# Patient Record
Sex: MALE | Race: WHITE | Employment: PART TIME | URBAN - METROPOLITAN AREA
[De-identification: names, ages, dates, MRNs, and addresses within clinical notes are randomized per-mention and may not be internally consistent; named-entity substitution may affect disease eponyms.]

---

## 2019-12-05 ENCOUNTER — OFFICE VISIT (OUTPATIENT)
Dept: DERMATOLOGY | Age: 29
End: 2019-12-05
Payer: COMMERCIAL

## 2019-12-05 VITALS
HEART RATE: 82 BPM | WEIGHT: 269.4 LBS | SYSTOLIC BLOOD PRESSURE: 139 MMHG | BODY MASS INDEX: 39.9 KG/M2 | OXYGEN SATURATION: 98 % | DIASTOLIC BLOOD PRESSURE: 85 MMHG | HEIGHT: 69 IN

## 2019-12-05 DIAGNOSIS — L40.9 PSORIASIS: Primary | ICD-10-CM

## 2019-12-05 PROCEDURE — 99202 OFFICE O/P NEW SF 15 MIN: CPT | Performed by: DERMATOLOGY

## 2019-12-05 RX ORDER — TRIAMCINOLONE ACETONIDE 0.25 MG/G
CREAM TOPICAL
Qty: 30 G | Refills: 2 | Status: SHIPPED | OUTPATIENT
Start: 2019-12-05 | End: 2020-05-02 | Stop reason: SDUPTHER

## 2019-12-05 RX ORDER — KETOCONAZOLE 20 MG/ML
SHAMPOO TOPICAL
Qty: 120 ML | Refills: 2 | Status: SHIPPED | OUTPATIENT
Start: 2019-12-05 | End: 2020-05-02 | Stop reason: SDUPTHER

## 2019-12-05 RX ORDER — BETAMETHASONE DIPROPIONATE 0.05 %
OINTMENT (GRAM) TOPICAL
Qty: 50 G | Refills: 2 | Status: SHIPPED | OUTPATIENT
Start: 2019-12-05 | End: 2020-05-02 | Stop reason: SDUPTHER

## 2020-02-08 ENCOUNTER — HOSPITAL ENCOUNTER (EMERGENCY)
Age: 30
Discharge: HOME | End: 2020-02-08
Payer: COMMERCIAL

## 2020-02-08 VITALS
SYSTOLIC BLOOD PRESSURE: 142 MMHG | DIASTOLIC BLOOD PRESSURE: 92 MMHG | OXYGEN SATURATION: 97 % | HEART RATE: 88 BPM | TEMPERATURE: 98.2 F | BODY MASS INDEX: 39.25 KG/M2 | HEIGHT: 69 IN | WEIGHT: 265 LBS | RESPIRATION RATE: 16 BRPM

## 2020-02-08 PROCEDURE — 99213 OFFICE O/P EST LOW 20 MIN: CPT | Performed by: NURSE PRACTITIONER

## 2020-02-08 PROCEDURE — 99212 OFFICE O/P EST SF 10 MIN: CPT

## 2020-02-08 RX ORDER — PREDNISONE 20 MG/1
40 TABLET ORAL DAILY
Qty: 14 TABLET | Refills: 0 | Status: SHIPPED | OUTPATIENT
Start: 2020-02-08 | End: 2020-02-15

## 2020-02-08 RX ORDER — AMOXICILLIN AND CLAVULANATE POTASSIUM 875; 125 MG/1; MG/1
1 TABLET, FILM COATED ORAL 2 TIMES DAILY
Qty: 14 TABLET | Refills: 0 | Status: SHIPPED | OUTPATIENT
Start: 2020-02-08 | End: 2020-02-15

## 2020-02-08 ASSESSMENT — PAIN DESCRIPTION - FREQUENCY: FREQUENCY: CONTINUOUS

## 2020-02-08 ASSESSMENT — ENCOUNTER SYMPTOMS
SORE THROAT: 0
VOMITING: 0
COUGH: 1
SINUS PRESSURE: 1
SHORTNESS OF BREATH: 0
NAUSEA: 0

## 2020-02-08 ASSESSMENT — PAIN SCALES - GENERAL: PAINLEVEL_OUTOF10: 8

## 2020-02-08 ASSESSMENT — PAIN DESCRIPTION - PAIN TYPE: TYPE: ACUTE PAIN

## 2020-02-08 ASSESSMENT — PAIN DESCRIPTION - ONSET: ONSET: AWAKENED FROM SLEEP

## 2020-02-08 ASSESSMENT — PAIN DESCRIPTION - PROGRESSION: CLINICAL_PROGRESSION: GRADUALLY WORSENING

## 2020-02-08 ASSESSMENT — PAIN - FUNCTIONAL ASSESSMENT: PAIN_FUNCTIONAL_ASSESSMENT: ACTIVITIES ARE NOT PREVENTED

## 2020-02-08 ASSESSMENT — PAIN DESCRIPTION - LOCATION: LOCATION: FACE

## 2020-02-08 ASSESSMENT — PAIN DESCRIPTION - DESCRIPTORS: DESCRIPTORS: DISCOMFORT;PRESSURE;POUNDING

## 2020-03-06 ENCOUNTER — OFFICE VISIT (OUTPATIENT)
Dept: DERMATOLOGY | Age: 30
End: 2020-03-06
Payer: COMMERCIAL

## 2020-03-06 VITALS
HEART RATE: 81 BPM | WEIGHT: 271.6 LBS | SYSTOLIC BLOOD PRESSURE: 122 MMHG | DIASTOLIC BLOOD PRESSURE: 84 MMHG | OXYGEN SATURATION: 97 % | HEIGHT: 66 IN | BODY MASS INDEX: 43.65 KG/M2

## 2020-03-06 PROCEDURE — 99213 OFFICE O/P EST LOW 20 MIN: CPT | Performed by: DERMATOLOGY

## 2020-03-06 NOTE — PROGRESS NOTES
effective  at a safe dose. The dose will be adjusted according to your response to  treatment and any side effects you may experience. What are the possible side effects of methotrexate? Methotrexate can cause nausea, tiredness, diarrhoea or mouth ulcers. Rarely  hair loss and rashes may occur. Methotrexate can affect the white blood cell  count so that fewer white blood cells are produced (bone marrow suppression). You will be more prone to develop infections such as chest infections. You should see your doctor if:   You develop a sore throat, a fever or any other symptoms or signs of  infection.  You develop mouth ulcers.  You develop unexplained bruising or bleeding from the gums.  You develop nausea, vomiting, abdominal pain or dark urine.  You become breathless or develop a cough. Folic acid is frequently recommended as a vitamin supplement when taking  methotrexate because it may reduce the incidence of side effects such as  nausea. Liver and lung fibrosis are very rare complications that may occur when the  methotrexate has been taken for a number of years. If you have not had chicken-pox previously and come into contact with someone  who has chicken-pox or shingles infection, or if you develop chicken-pox or  shingles while you are taking methotrexate, you should see your doctor  immediately as you may need specialist treatment. How will I be monitored for the side effects of methotrexate treatment? Your doctor will arrange for you to have regular blood tests while you are taking  methotrexate. You must not take methotrexate unless you are having these  regular blood checks. Can I have vaccinations whilst on methotrexate? It is recommended that you should not be immunised using any of the 'live'  vaccines such as MMR (measles, mumps, rubella), polio and yellow fever.  An  'inactivated' polio vaccine can be given instead of the 'live' one and the  'inactivated' version should also be given to people you are in close contact  with, e.g. members of your household. If you are on methotrexate you should avoid contact with children who have  been given the 'live' polio vaccine, for 4-6 weeks after the vaccination. This  precaution is not necessary if inactivated polio vaccine is given. If you require  immunisation with a live vaccine, methotrexate should be stopped 6 months  before the vaccination and not prescribed until 2 weeks after the vaccination  has been given. Yearly flu and five yearly Pneumovax vaccinations are safe  and recommended. However, the new nasal flu vaccination is a live vaccine  and should not be given to patients on methotrexate (see Patient Information  Leaflet on Immunisations). Does methotrexate affect fertility, pregnancy and breast feeding? Methotrexate can reduce fertility in men, especially at higher doses, and is likely  to harm an unborn baby. Women must not take methotrexate during  pregnancy. Men and women should take effective contraceptive precautions  whilst taking methotrexate and for at least 3 months after stopping the  Methotrexate. If you are planning a family, or if you become pregnant while on methotrexate,  you must discuss this with your doctor as soon as possible. You must not breast feed if you are taking methotrexate. May I drink alcohol while I am taking methotrexate? Alcohol does interact with methotrexate. Both alcohol and methotrexate can  potentially damage the liver. It is advisable to keep alcohol consumption to a  minimum while taking methotrexate. Can I take other medicines at the same time as methotrexate? Some drugs interact with methotrexate and this can be dangerous. You should  always tell any doctor, nurse or pharmacist treating you that you are taking  Methotrexate. Special care is needed with non-steroidal anti-inflammatory drugs, such as  aspirin or ibuprofen.  You should only take anti-inflammatory drugs if your doctor  prescribes them for you. Paracetamol preparations are generally safer to take. Do not take 'over-the-counter' herbal or vitamin preparations without discussing  this first with your doctor, nurse or pharmacist. You must always avoid  antibiotics containing trimethoprim. Where can I find out more about methotrexate? If you want to know more about methotrexate, you should speak to your doctor  or pharmacist. Please note that this information leaflet does not list all of the  side effects of methotrexate. For further details, look at the drug information  sheet which comes as an insert with your prescription for methotrexate. Source: [facility]        Follow-up: No follow-ups on file. This note was created with the assistance of a speech-recognition program.  Although the intention is to generate a document that actually reflects the content of the visit, no guarantees can be provided that every mistake has been identified and corrected byediting.     Electronically signed by Evelyn Aparicio MD on 3/6/20 at 9:30 AM

## 2020-03-06 NOTE — PATIENT INSTRUCTIONS
recommended that you should not be immunised using any of the 'live'  vaccines such as MMR (measles, mumps, rubella), polio and yellow fever. An  'inactivated' polio vaccine can be given instead of the 'live' one and the  'inactivated' version should also be given to people you are in close contact  with, e.g. members of your household. If you are on methotrexate you should avoid contact with children who have  been given the 'live' polio vaccine, for 4-6 weeks after the vaccination. This  precaution is not necessary if inactivated polio vaccine is given. If you require  immunisation with a live vaccine, methotrexate should be stopped 6 months  before the vaccination and not prescribed until 2 weeks after the vaccination  has been given. Yearly flu and five yearly Pneumovax vaccinations are safe  and recommended. However, the new nasal flu vaccination is a live vaccine  and should not be given to patients on methotrexate (see Patient Information  Leaflet on Immunisations). Does methotrexate affect fertility, pregnancy and breast feeding? Methotrexate can reduce fertility in men, especially at higher doses, and is likely  to harm an unborn baby. Women must not take methotrexate during  pregnancy. Men and women should take effective contraceptive precautions  whilst taking methotrexate and for at least 3 months after stopping the  Methotrexate. If you are planning a family, or if you become pregnant while on methotrexate,  you must discuss this with your doctor as soon as possible. You must not breast feed if you are taking methotrexate. May I drink alcohol while I am taking methotrexate? Alcohol does interact with methotrexate. Both alcohol and methotrexate can  potentially damage the liver. It is advisable to keep alcohol consumption to a  minimum while taking methotrexate. Can I take other medicines at the same time as methotrexate?     Some drugs interact with methotrexate and this can be dangerous. You should  always tell any doctor, nurse or pharmacist treating you that you are taking  Methotrexate. Special care is needed with non-steroidal anti-inflammatory drugs, such as  aspirin or ibuprofen. You should only take anti-inflammatory drugs if your doctor  prescribes them for you. Paracetamol preparations are generally safer to take. Do not take 'over-the-counter' herbal or vitamin preparations without discussing  this first with your doctor, nurse or pharmacist. You must always avoid  antibiotics containing trimethoprim. Where can I find out more about methotrexate? If you want to know more about methotrexate, you should speak to your doctor  or pharmacist. Please note that this information leaflet does not list all of the  side effects of methotrexate. For further details, look at the drug information  sheet which comes as an insert with your prescription for methotrexate.     Source: [facility]

## 2020-03-10 ENCOUNTER — HOSPITAL ENCOUNTER (OUTPATIENT)
Age: 30
Discharge: HOME | End: 2020-03-10
Payer: COMMERCIAL

## 2020-03-10 LAB
ALBUMIN SERPL-MCNC: 4.7 G/DL (ref 3.5–5.1)
ALP BLD-CCNC: 83 U/L (ref 38–126)
ALT SERPL-CCNC: 53 U/L (ref 11–66)
ANION GAP SERPL CALCULATED.3IONS-SCNC: 15 MEQ/L (ref 8–16)
AST SERPL-CCNC: 29 U/L (ref 5–40)
BASOPHILS # BLD: 0.6 %
BASOPHILS ABSOLUTE: 0 THOU/MM3 (ref 0–0.1)
BILIRUB SERPL-MCNC: 0.6 MG/DL (ref 0.3–1.2)
BUN BLDV-MCNC: 13 MG/DL (ref 7–22)
CALCIUM SERPL-MCNC: 10.1 MG/DL (ref 8.5–10.5)
CHLORIDE BLD-SCNC: 102 MEQ/L (ref 98–111)
CO2: 26 MEQ/L (ref 23–33)
CREAT SERPL-MCNC: 0.9 MG/DL (ref 0.4–1.2)
EOSINOPHIL # BLD: 1.1 %
EOSINOPHILS ABSOLUTE: 0.1 THOU/MM3 (ref 0–0.4)
ERYTHROCYTE [DISTWIDTH] IN BLOOD BY AUTOMATED COUNT: 12.8 % (ref 11.5–14.5)
ERYTHROCYTE [DISTWIDTH] IN BLOOD BY AUTOMATED COUNT: 37.9 FL (ref 35–45)
GFR SERPL CREATININE-BSD FRML MDRD: > 90 ML/MIN/1.73M2
GLUCOSE BLD-MCNC: 89 MG/DL (ref 70–108)
HAV IGM SER IA-ACNC: NEGATIVE
HCT VFR BLD CALC: 51.1 % (ref 42–52)
HEMOGLOBIN: 16.8 GM/DL (ref 14–18)
HEPATITIS B CORE IGM ANTIBODY: NEGATIVE
HEPATITIS B SURFACE ANTIGEN: NEGATIVE
HEPATITIS C ANTIBODY: NEGATIVE
IMMATURE GRANS (ABS): 0.02 THOU/MM3 (ref 0–0.07)
IMMATURE GRANULOCYTES: 0.2 %
LYMPHOCYTES # BLD: 32.9 %
LYMPHOCYTES ABSOLUTE: 2.7 THOU/MM3 (ref 1–4.8)
MCH RBC QN AUTO: 27.5 PG (ref 26–33)
MCHC RBC AUTO-ENTMCNC: 32.9 GM/DL (ref 32.2–35.5)
MCV RBC AUTO: 83.6 FL (ref 80–94)
MONOCYTES # BLD: 13.2 %
MONOCYTES ABSOLUTE: 1.1 THOU/MM3 (ref 0.4–1.3)
NUCLEATED RED BLOOD CELLS: 0 /100 WBC
PLATELET # BLD: 187 THOU/MM3 (ref 130–400)
PMV BLD AUTO: 9.9 FL (ref 9.4–12.4)
POTASSIUM SERPL-SCNC: 3.7 MEQ/L (ref 3.5–5.2)
RBC # BLD: 6.11 MILL/MM3 (ref 4.7–6.1)
SEG NEUTROPHILS: 52 %
SEGMENTED NEUTROPHILS ABSOLUTE COUNT: 4.3 THOU/MM3 (ref 1.8–7.7)
SODIUM BLD-SCNC: 143 MEQ/L (ref 135–145)
TOTAL PROTEIN: 8 G/DL (ref 6.1–8)
WBC # BLD: 8.2 THOU/MM3 (ref 4.8–10.8)

## 2020-03-10 PROCEDURE — 36415 COLL VENOUS BLD VENIPUNCTURE: CPT

## 2020-03-10 PROCEDURE — 87389 HIV-1 AG W/HIV-1&-2 AB AG IA: CPT

## 2020-03-10 PROCEDURE — 80053 COMPREHEN METABOLIC PANEL: CPT

## 2020-03-10 PROCEDURE — 85025 COMPLETE CBC W/AUTO DIFF WBC: CPT

## 2020-03-10 PROCEDURE — 80074 ACUTE HEPATITIS PANEL: CPT

## 2020-03-10 PROCEDURE — 86480 TB TEST CELL IMMUN MEASURE: CPT

## 2020-03-11 LAB — HIV-2 AB: NEGATIVE

## 2020-03-12 LAB
QUANTI TB GOLD PLUS: NEGATIVE
QUANTI TB1 MINUS NIL: 0 IU/ML (ref 0–0.34)
QUANTI TB2 MINUS NIL: 0.01 IU/ML (ref 0–0.34)
QUANTIFERON MITOGEN MINUS NIL: 7.09 IU/ML
QUANTIFERON NIL: 0.03 IU/ML

## 2020-03-13 ENCOUNTER — TELEPHONE (OUTPATIENT)
Dept: DERMATOLOGY | Age: 30
End: 2020-03-13

## 2020-03-13 RX ORDER — FOLIC ACID 1 MG/1
1 TABLET ORAL DAILY
Qty: 30 TABLET | Refills: 5 | Status: SHIPPED | OUTPATIENT
Start: 2020-03-13

## 2020-03-13 NOTE — TELEPHONE ENCOUNTER
Please inform patient that labs are normal.    Start MTX 10 mg weekly x 2 weeks and folic acid 1 mg pill every day while on the methotrexate. Both are prescribed. He needs labs in 2 weeks prior to refilling and continuing the methotrexate. I recommend he go back to the lab about 3 days after his second dose. The orders for labs are entered.

## 2020-05-04 RX ORDER — BETAMETHASONE DIPROPIONATE 0.05 %
OINTMENT (GRAM) TOPICAL
Qty: 50 G | Refills: 0 | Status: SHIPPED | OUTPATIENT
Start: 2020-05-04 | End: 2020-09-01 | Stop reason: SDUPTHER

## 2020-05-04 RX ORDER — KETOCONAZOLE 20 MG/ML
SHAMPOO TOPICAL
Qty: 120 ML | Refills: 0 | Status: SHIPPED | OUTPATIENT
Start: 2020-05-04 | End: 2020-09-01 | Stop reason: SDUPTHER

## 2020-05-04 RX ORDER — TRIAMCINOLONE ACETONIDE 0.25 MG/G
CREAM TOPICAL
Qty: 30 G | Refills: 0 | Status: SHIPPED | OUTPATIENT
Start: 2020-05-04 | End: 2020-09-01 | Stop reason: SDUPTHER

## 2020-05-04 NOTE — TELEPHONE ENCOUNTER
Robertson Cuba is requesting a refill on the following medications:   Requested Prescriptions     Pending Prescriptions Disp Refills    betamethasone dipropionate (DIPROLENE) 0.05 % ointment 50 g 2     Sig: Apply to psoriasis on scalp and body twice daily x 2 weeks, daily x 2 weeks, then every other day until clear    triamcinolone (KENALOG) 0.025 % cream 30 g 2     Sig: Apply to psoriasis on face twice daily until clear    ketoconazole (NIZORAL) 2 % shampoo 120 mL 2     Sig: Apply 3-4 times weekly to scalp, leave on for five minutes prior to washing off       Last OV 3/6    Future Appointments   Date Time Provider Veronique Mallory   5/13/2020  9:30 AM MD gerard Mcmahon derm [facility]

## 2020-09-02 RX ORDER — BETAMETHASONE DIPROPIONATE 0.05 %
OINTMENT (GRAM) TOPICAL
Qty: 50 G | Refills: 2 | Status: SHIPPED | OUTPATIENT
Start: 2020-09-02

## 2020-09-02 RX ORDER — TRIAMCINOLONE ACETONIDE 0.25 MG/G
CREAM TOPICAL
Qty: 30 G | Refills: 2 | Status: SHIPPED | OUTPATIENT
Start: 2020-09-02

## 2020-09-02 RX ORDER — KETOCONAZOLE 20 MG/ML
SHAMPOO TOPICAL
Qty: 120 ML | Refills: 0 | Status: SHIPPED | OUTPATIENT
Start: 2020-09-02 | End: 2020-12-28

## 2020-12-28 RX ORDER — KETOCONAZOLE 20 MG/ML
SHAMPOO TOPICAL
Qty: 120 ML | Refills: 2 | Status: SHIPPED | OUTPATIENT
Start: 2020-12-28

## 2020-12-28 NOTE — TELEPHONE ENCOUNTER
Paul Clark is requesting a refill on the following medications:   Requested Prescriptions     Pending Prescriptions Disp Refills    ketoconazole (NIZORAL) 2 % shampoo [Pharmacy Med Name: KETOCONAZOLE 2% SHAMPOO] 120 mL 0     Sig: shampoo as directed 3 TO 4 TIMES PER WEEK--LEAVE ON FOR 5 MINUTES PRIOR TO WASHING OFF       Last OV 3/6    No future appointments. Vida Baker

## 2021-03-30 ENCOUNTER — HOSPITAL ENCOUNTER (OUTPATIENT)
Dept: ULTRASOUND IMAGING | Age: 31
Discharge: HOME | End: 2021-03-30
Payer: COMMERCIAL

## 2021-03-30 DIAGNOSIS — N50.89 TESTICULAR NODULE: ICD-10-CM

## 2021-03-30 DIAGNOSIS — N50.89: ICD-10-CM

## 2021-03-30 PROCEDURE — 76870 US EXAM SCROTUM: CPT

## 2022-12-15 ENCOUNTER — OFFICE VISIT (OUTPATIENT)
Dept: FAMILY MEDICINE CLINIC | Age: 32
End: 2022-12-15
Payer: COMMERCIAL

## 2022-12-15 VITALS
RESPIRATION RATE: 18 BRPM | OXYGEN SATURATION: 97 % | DIASTOLIC BLOOD PRESSURE: 88 MMHG | WEIGHT: 279 LBS | SYSTOLIC BLOOD PRESSURE: 142 MMHG | BODY MASS INDEX: 45.03 KG/M2 | HEART RATE: 88 BPM

## 2022-12-15 DIAGNOSIS — R03.0 SINGLE EPISODE OF ELEVATED BLOOD PRESSURE: ICD-10-CM

## 2022-12-15 DIAGNOSIS — L40.9 PSORIASIS: Primary | ICD-10-CM

## 2022-12-15 PROCEDURE — 99203 OFFICE O/P NEW LOW 30 MIN: CPT

## 2022-12-15 RX ORDER — TRIAMCINOLONE ACETONIDE 0.25 MG/G
CREAM TOPICAL
Qty: 30 G | Refills: 2 | Status: SHIPPED | OUTPATIENT
Start: 2022-12-15

## 2022-12-15 RX ORDER — BETAMETHASONE DIPROPIONATE 0.5 MG/G
CREAM TOPICAL
Qty: 15 G | Refills: 0 | Status: SHIPPED | OUTPATIENT
Start: 2022-12-15

## 2022-12-15 RX ORDER — KETOCONAZOLE 20 MG/ML
SHAMPOO TOPICAL
Qty: 120 ML | Refills: 2 | Status: SHIPPED | OUTPATIENT
Start: 2022-12-15

## 2022-12-15 SDOH — ECONOMIC STABILITY: FOOD INSECURITY: WITHIN THE PAST 12 MONTHS, YOU WORRIED THAT YOUR FOOD WOULD RUN OUT BEFORE YOU GOT MONEY TO BUY MORE.: NEVER TRUE

## 2022-12-15 SDOH — ECONOMIC STABILITY: FOOD INSECURITY: WITHIN THE PAST 12 MONTHS, THE FOOD YOU BOUGHT JUST DIDN'T LAST AND YOU DIDN'T HAVE MONEY TO GET MORE.: NEVER TRUE

## 2022-12-15 SDOH — ECONOMIC STABILITY: TRANSPORTATION INSECURITY
IN THE PAST 12 MONTHS, HAS THE LACK OF TRANSPORTATION KEPT YOU FROM MEDICAL APPOINTMENTS OR FROM GETTING MEDICATIONS?: NO

## 2022-12-15 SDOH — ECONOMIC STABILITY: TRANSPORTATION INSECURITY
IN THE PAST 12 MONTHS, HAS LACK OF TRANSPORTATION KEPT YOU FROM MEETINGS, WORK, OR FROM GETTING THINGS NEEDED FOR DAILY LIVING?: NO

## 2022-12-15 ASSESSMENT — PATIENT HEALTH QUESTIONNAIRE - PHQ9
SUM OF ALL RESPONSES TO PHQ QUESTIONS 1-9: 0
9. THOUGHTS THAT YOU WOULD BE BETTER OFF DEAD, OR OF HURTING YOURSELF: 0
3. TROUBLE FALLING OR STAYING ASLEEP: 0
5. POOR APPETITE OR OVEREATING: 0
1. LITTLE INTEREST OR PLEASURE IN DOING THINGS: 0
6. FEELING BAD ABOUT YOURSELF - OR THAT YOU ARE A FAILURE OR HAVE LET YOURSELF OR YOUR FAMILY DOWN: 0
7. TROUBLE CONCENTRATING ON THINGS, SUCH AS READING THE NEWSPAPER OR WATCHING TELEVISION: 0
10. IF YOU CHECKED OFF ANY PROBLEMS, HOW DIFFICULT HAVE THESE PROBLEMS MADE IT FOR YOU TO DO YOUR WORK, TAKE CARE OF THINGS AT HOME, OR GET ALONG WITH OTHER PEOPLE: 0
SUM OF ALL RESPONSES TO PHQ QUESTIONS 1-9: 0
4. FEELING TIRED OR HAVING LITTLE ENERGY: 0
SUM OF ALL RESPONSES TO PHQ QUESTIONS 1-9: 0
SUM OF ALL RESPONSES TO PHQ9 QUESTIONS 1 & 2: 0
8. MOVING OR SPEAKING SO SLOWLY THAT OTHER PEOPLE COULD HAVE NOTICED. OR THE OPPOSITE, BEING SO FIGETY OR RESTLESS THAT YOU HAVE BEEN MOVING AROUND A LOT MORE THAN USUAL: 0
2. FEELING DOWN, DEPRESSED OR HOPELESS: 0
SUM OF ALL RESPONSES TO PHQ QUESTIONS 1-9: 0

## 2022-12-15 ASSESSMENT — SOCIAL DETERMINANTS OF HEALTH (SDOH): HOW HARD IS IT FOR YOU TO PAY FOR THE VERY BASICS LIKE FOOD, HOUSING, MEDICAL CARE, AND HEATING?: NOT HARD AT ALL

## 2022-12-15 NOTE — PATIENT INSTRUCTIONS
Kenalog cream for face BID. Betamethasone dipropionate cream to rest of body BID  Ketoconazole shampoo.

## 2022-12-15 NOTE — PROGRESS NOTES
[address] [place] [address] DR. Cavazos [place] [address]  Dept: [address]:  [facility] [place] (:  1990) is a 29 y.o. male, here for evaluation of the following chief complaint(s):  New Patient ([facility])      ASSESSMENT/PLAN:  1. Psoriasis  -     ketoconazole (NIZORAL) 2 % shampoo; shampoo as directed 3 TO 4 TIMES PER WEEK--LEAVE ON FOR 5 MINUTES PRIOR TO WASHING OFF, Disp-120 mL, R-2, Normal  -     betamethasone dipropionate 0.05 % cream; Apply topically 2 times daily. , Disp-15 g, R-0, Normal  -     triamcinolone (KENALOG) 0.025 % cream; Apply to psoriasis on face twice daily until clear, Disp-30 g, R-2, Normal  2. Single episode of elevated blood pressure    Will restart past psoriasis topical treatments. Ketoconazole shampoo, kenalog cream for face, and diprolene cream for scattered body spots not on face, head, or neck. -If does not improve with topical may consider other therapies including biological injections like Humira. At that time would need additional lab work to evaluate if Pily Chang would be a good candidate for this. -BP elevated this visit. Discussed importance of life style modifications. Patient does not wish to start BP medications at this time and states he will work on improving diet and exercise. Instructed him to monitor BP at home and follow up if remains elevated. Return for As needed or if symptoms don't improve. Follow up in month if symptoms are not tolerable at that point. SUBJECTIVE/OBJECTIVE:  SIENA    Pily Chang is a new patient here to [facility]. He has history of moderate to severe psoriasis for which he previously followed dermatology for in the past. He was diagnosed with psoriasis around 2019. States his triggers are cold weather and stress. Sun light does greatly improve his psoriasis. He has not been back to dermatology since 2020.  He stopped taking his medications around December of 2020 and tried to manage with OTC lotions including Cereve. At the time of his last visit with dermatology he was be considered to start methotrexate but at that time opted out of that. Over the last few months his psoriasis has been worsening which coincides with his triggers of colder months and increased stressed. 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 He would like to be reevaluated for his psoriasis treatment but does not wish to go to dermatology. Psoriasis affects his face, mainly nasal folds, eyebrows, forehead, as well as top of his head, occipital region of his head, shoulders, bilateral knees, bilateral elbows, abdomin, buttocks, back, and lower legs. Spots are red, dry, flaky, scaly, and are extremely pruritic and painful to touch. Never has drainage or pus from skin lesions. No fevers. Other then psoriasis he does not report any past medical history. Family history of HTN and DM2. Does not currently watch his diet or exercise regularly. Past Medical History:   Diagnosis Date    H/O psoriasis         History reviewed. No pertinent surgical history.     Social History     Socioeconomic History    Marital status:      Spouse name: Not on file    Number of children: Not on file    Years of education: Not on file    Highest education level: Not on file   Occupational History    Not on file   Tobacco Use    Smoking status: Some Days     Types: Cigarettes    Smokeless tobacco: Never   Substance and Sexual Activity    Alcohol use: Yes    Drug use: No    Sexual activity: Not on file   Other Topics Concern    Not on file   Social History Narrative    Not on file     Social Determinants of Health     Financial Resource Strain: Low Risk     Difficulty of Paying Living Expenses: Not hard at all   Food Insecurity: No Food Insecurity    Worried About [address] in the Last Year: Never true    [address] in the Last Year: Never true   Transportation Needs: No Transportation Needs    Lack of Transportation (Medical): No    Lack of Transportation (Non-Medical): No   Physical Activity: Not on file   Stress: Not on file   Social Connections: Not on file   Intimate Partner Violence: Not on file   Housing Stability: Not on file        Family History   Problem Relation Age of Onset    Diabetes Father     High Blood Pressure Father         No Known Allergies     Review of Systems   Constitutional:  Negative for activity change, appetite change, chills, diaphoresis, fatigue, fever and unexpected weight change. HENT:  Negative for congestion, ear pain, postnasal drip, sinus pressure and sore throat. Eyes:  Negative for photophobia, pain, discharge, redness, itching and visual disturbance. Respiratory:  Negative for cough, chest tightness, shortness of breath, wheezing and stridor. Cardiovascular:  Negative for chest pain, palpitations and leg swelling. Gastrointestinal:  Negative for abdominal distention, abdominal pain, constipation, diarrhea, nausea and vomiting. Endocrine: Negative for polydipsia, polyphagia and polyuria. Genitourinary:  Negative for decreased urine volume, difficulty urinating, dysuria, flank pain, frequency, hematuria and urgency. Musculoskeletal:  Negative for arthralgias, back pain, gait problem, joint swelling, myalgias and neck pain. Skin:  Positive for rash (Thick, raised, red, scaled rash spread throughout body. Pruritc and painful). Negative for pallor. Allergic/Immunologic: Negative for environmental allergies and food allergies. Neurological:  Negative for dizziness, syncope, weakness, light-headedness, numbness and headaches. Hematological:  Negative for adenopathy. Psychiatric/Behavioral:  Negative for behavioral problems, self-injury, sleep disturbance and suicidal ideas. The patient is not nervous/anxious. Physical Exam  Vitals and nursing note reviewed.    Constitutional: General: He is not in acute distress. Appearance: Normal appearance. He is well-developed. He is obese. He is not diaphoretic. HENT:      Head: Normocephalic and atraumatic. Right Ear: External ear normal.      Left Ear: External ear normal.      Nose: Nose normal.   Eyes:      General: No scleral icterus. Right eye: No discharge. Left eye: No discharge. Conjunctiva/sclera: Conjunctivae normal.      Pupils: Pupils are equal, round, and reactive to light. Neck:      Thyroid: No thyromegaly. Cardiovascular:      Rate and Rhythm: Normal rate and regular rhythm. Pulses: Normal pulses. Heart sounds: Normal heart sounds. No murmur heard. Pulmonary:      Effort: Pulmonary effort is normal. No respiratory distress. Breath sounds: Normal breath sounds. No wheezing or rales. Abdominal:      Tenderness: There is no abdominal tenderness. Musculoskeletal:         General: Normal range of motion. Right shoulder: No swelling, tenderness or bony tenderness. Left shoulder: No swelling, tenderness or bony tenderness. Cervical back: Neck supple. No deformity, spasms, tenderness or bony tenderness. Thoracic back: No spasms, tenderness or bony tenderness. Lumbar back: No swelling, deformity, spasms, tenderness or bony tenderness. Right lower leg: No edema. Left lower leg: No edema. Skin:     General: Skin is warm and dry. Coloration: Skin is not pale. Findings: Rash present. Comments: Various patches of thick, erythremic, silvery-white scales spread throughout the body. Lesions noted to nasal folds, forehead, scalp, bilateral elbows, torso, back, bilateral knees, and bilateral lower legs. Neurological:      General: No focal deficit present. Mental Status: He is alert and oriented to person, place, and time. Motor: No abnormal muscle tone.       Coordination: Coordination normal.   Psychiatric:         Mood and Affect: Mood normal.         Speech: Speech normal.         Behavior: Behavior normal.         Thought Content: Thought content normal.         Judgment: Judgment normal.       Vitals:    12/15/22 1356   BP: (!) 142/88   Pulse: 88   Resp: 18   SpO2: 97%        On this date 12/15/2022 I have spent 34 minutes reviewing previous notes, test results and face to face with the patient discussing the diagnosis and importance of compliance with the treatment plan as well as documenting on the day of the visit. An electronic signature was used to authenticate this note.     LORAINE Hay - CNP

## 2022-12-16 ASSESSMENT — ENCOUNTER SYMPTOMS
SINUS PRESSURE: 0
STRIDOR: 0
WHEEZING: 0
PHOTOPHOBIA: 0
ABDOMINAL DISTENTION: 0
EYE DISCHARGE: 0
ABDOMINAL PAIN: 0
CONSTIPATION: 0
VOMITING: 0
DIARRHEA: 0
COUGH: 0
EYE REDNESS: 0
BACK PAIN: 0
NAUSEA: 0
SORE THROAT: 0
EYE ITCHING: 0
CHEST TIGHTNESS: 0
EYE PAIN: 0
SHORTNESS OF BREATH: 0

## 2022-12-31 DIAGNOSIS — L40.9 PSORIASIS: ICD-10-CM

## 2023-01-03 RX ORDER — TRIAMCINOLONE ACETONIDE 0.25 MG/G
CREAM TOPICAL
Qty: 30 G | Refills: 2 | Status: SHIPPED | OUTPATIENT
Start: 2023-01-03

## 2023-01-03 RX ORDER — BETAMETHASONE DIPROPIONATE 0.5 MG/G
CREAM TOPICAL
Qty: 15 G | Refills: 0 | Status: SHIPPED | OUTPATIENT
Start: 2023-01-03

## 2023-01-03 NOTE — TELEPHONE ENCOUNTER
Last appointment this department: 12/15/2022  Next appointment this department: Visit date not found

## 2023-01-11 DIAGNOSIS — L40.0 PLAQUE PSORIASIS: Primary | ICD-10-CM

## 2023-01-11 NOTE — PROGRESS NOTES
Called and discussed with patient regarding his plaque psoriasis. He currently uses betamethasone dipropionate 0.05% cream, kenalog 0.025% cream, and ketoconazole 2% shampoo. Rash has not improved. At this time I will place a referral for dermatology to further follow his plaque psoriasis with failed topical treatment. He is interested in systemic therapies.

## 2023-02-16 ENCOUNTER — HOSPITAL ENCOUNTER (OUTPATIENT)
Age: 33
Discharge: HOME | End: 2023-02-16
Payer: COMMERCIAL

## 2023-02-16 ENCOUNTER — HOSPITAL ENCOUNTER (OUTPATIENT)
Dept: GENERAL RADIOLOGY | Age: 33
Discharge: HOME | End: 2023-02-16
Payer: COMMERCIAL

## 2023-02-16 ENCOUNTER — OFFICE VISIT (OUTPATIENT)
Dept: FAMILY MEDICINE CLINIC | Age: 33
End: 2023-02-16
Payer: COMMERCIAL

## 2023-02-16 VITALS
HEART RATE: 82 BPM | BODY MASS INDEX: 44.84 KG/M2 | DIASTOLIC BLOOD PRESSURE: 84 MMHG | OXYGEN SATURATION: 99 % | SYSTOLIC BLOOD PRESSURE: 126 MMHG | WEIGHT: 279 LBS | HEIGHT: 66 IN | RESPIRATION RATE: 20 BRPM

## 2023-02-16 DIAGNOSIS — L40.9 PSORIASIS: ICD-10-CM

## 2023-02-16 DIAGNOSIS — R53.83 OTHER FATIGUE: ICD-10-CM

## 2023-02-16 DIAGNOSIS — R07.9 CHEST PAIN, UNSPECIFIED TYPE: Primary | ICD-10-CM

## 2023-02-16 DIAGNOSIS — N50.89 TESTICULAR NODULE: ICD-10-CM

## 2023-02-16 DIAGNOSIS — R06.02 SHORTNESS OF BREATH: ICD-10-CM

## 2023-02-16 DIAGNOSIS — R07.9 CHEST PAIN, UNSPECIFIED TYPE: ICD-10-CM

## 2023-02-16 DIAGNOSIS — Z13.220 SCREENING FOR LIPID DISORDERS: ICD-10-CM

## 2023-02-16 DIAGNOSIS — N50.3 EPIDIDYMAL CYST: ICD-10-CM

## 2023-02-16 LAB
ANION GAP SERPL CALC-SCNC: 10 MEQ/L (ref 8–16)
BASOPHILS ABSOLUTE: 0.1 THOU/MM3 (ref 0–0.1)
BASOPHILS NFR BLD AUTO: 0.6 %
BUN SERPL-MCNC: 11 MG/DL (ref 7–22)
CALCIUM SERPL-MCNC: 9.5 MG/DL (ref 8.5–10.5)
CHLORIDE SERPL-SCNC: 104 MEQ/L (ref 98–111)
CHOLEST SERPL-MCNC: 147 MG/DL (ref 100–199)
CO2 SERPL-SCNC: 28 MEQ/L (ref 23–33)
CREAT SERPL-MCNC: 0.8 MG/DL (ref 0.4–1.2)
DEPRECATED RDW RBC AUTO: 38.1 FL (ref 35–45)
EOSINOPHIL NFR BLD AUTO: 1.1 %
EOSINOPHILS ABSOLUTE: 0.1 THOU/MM3 (ref 0–0.4)
ERYTHROCYTE [DISTWIDTH] IN BLOOD BY AUTOMATED COUNT: 12.9 % (ref 11.5–14.5)
GFR SERPL CREATININE-BSD FRML MDRD: > 60 ML/MIN/1.73M2
GLUCOSE SERPL-MCNC: 78 MG/DL (ref 70–108)
HCT VFR BLD AUTO: 49 % (ref 42–52)
HDLC SERPL-MCNC: 34 MG/DL
HGB BLD-MCNC: 16.3 GM/DL (ref 14–18)
IMM GRANULOCYTES # BLD AUTO: 0.03 THOU/MM3 (ref 0–0.07)
IMM GRANULOCYTES NFR BLD AUTO: 0.3 %
LDLC SERPL CALC-MCNC: 92 MG/DL
LYMPHOCYTES ABSOLUTE: 3.2 THOU/MM3 (ref 1–4.8)
LYMPHOCYTES NFR BLD AUTO: 27.9 %
MCH RBC QN AUTO: 27.4 PG (ref 26–33)
MCHC RBC AUTO-ENTMCNC: 33.3 GM/DL (ref 32.2–35.5)
MCV RBC AUTO: 82.4 FL (ref 80–94)
MONOCYTES ABSOLUTE: 1.2 THOU/MM3 (ref 0.4–1.3)
MONOCYTES NFR BLD AUTO: 10.8 %
NEUTROPHILS NFR BLD AUTO: 59.3 %
NRBC BLD AUTO-RTO: 0 /100 WBC
PLATELET # BLD AUTO: 199 THOU/MM3 (ref 130–400)
PMV BLD AUTO: 9.7 FL (ref 9.4–12.4)
POTASSIUM SERPL-SCNC: 3.9 MEQ/L (ref 3.5–5.2)
RBC # BLD AUTO: 5.95 MILL/MM3 (ref 4.7–6.1)
SEGMENTED NEUTROPHILS ABSOLUTE COUNT: 6.8 THOU/MM3 (ref 1.8–7.7)
SODIUM SERPL-SCNC: 142 MEQ/L (ref 135–145)
TRIGL SERPL-MCNC: 106 MG/DL (ref 0–199)
TSH SERPL DL<=0.005 MIU/L-ACNC: 3.86 UIU/ML (ref 0.4–4.2)
WBC # BLD AUTO: 11.4 THOU/MM3 (ref 4.8–10.8)

## 2023-02-16 PROCEDURE — 93000 ELECTROCARDIOGRAM COMPLETE: CPT

## 2023-02-16 PROCEDURE — 36415 COLL VENOUS BLD VENIPUNCTURE: CPT

## 2023-02-16 PROCEDURE — 84443 ASSAY THYROID STIM HORMONE: CPT

## 2023-02-16 PROCEDURE — 99213 OFFICE O/P EST LOW 20 MIN: CPT

## 2023-02-16 PROCEDURE — 80061 LIPID PANEL: CPT

## 2023-02-16 PROCEDURE — 80048 BASIC METABOLIC PNL TOTAL CA: CPT

## 2023-02-16 PROCEDURE — 71046 X-RAY EXAM CHEST 2 VIEWS: CPT

## 2023-02-16 PROCEDURE — 85025 COMPLETE CBC W/AUTO DIFF WBC: CPT

## 2023-02-16 SDOH — ECONOMIC STABILITY: HOUSING INSECURITY
IN THE LAST 12 MONTHS, WAS THERE A TIME WHEN YOU DID NOT HAVE A STEADY PLACE TO SLEEP OR SLEPT IN A SHELTER (INCLUDING NOW)?: NO

## 2023-02-16 SDOH — ECONOMIC STABILITY: FOOD INSECURITY: WITHIN THE PAST 12 MONTHS, THE FOOD YOU BOUGHT JUST DIDN'T LAST AND YOU DIDN'T HAVE MONEY TO GET MORE.: NEVER TRUE

## 2023-02-16 SDOH — ECONOMIC STABILITY: INCOME INSECURITY: HOW HARD IS IT FOR YOU TO PAY FOR THE VERY BASICS LIKE FOOD, HOUSING, MEDICAL CARE, AND HEATING?: NOT HARD AT ALL

## 2023-02-16 SDOH — ECONOMIC STABILITY: FOOD INSECURITY: WITHIN THE PAST 12 MONTHS, YOU WORRIED THAT YOUR FOOD WOULD RUN OUT BEFORE YOU GOT MONEY TO BUY MORE.: NEVER TRUE

## 2023-02-16 ASSESSMENT — ENCOUNTER SYMPTOMS
WHEEZING: 0
SINUS PRESSURE: 0
ABDOMINAL PAIN: 0
CHEST TIGHTNESS: 1
SORE THROAT: 0
EYE ITCHING: 0
BACK PAIN: 0
RHINORRHEA: 0
CHOKING: 0
EYE PAIN: 0
STRIDOR: 0
APNEA: 0
ABDOMINAL DISTENTION: 0
PHOTOPHOBIA: 0
CONSTIPATION: 0
SHORTNESS OF BREATH: 1
NAUSEA: 0
RECTAL PAIN: 1
VOMITING: 0
COUGH: 0
EYE REDNESS: 0
DIARRHEA: 0

## 2023-02-16 ASSESSMENT — PATIENT HEALTH QUESTIONNAIRE - PHQ9
SUM OF ALL RESPONSES TO PHQ QUESTIONS 1-9: 0
SUM OF ALL RESPONSES TO PHQ9 QUESTIONS 1 & 2: 0
1. LITTLE INTEREST OR PLEASURE IN DOING THINGS: 0
SUM OF ALL RESPONSES TO PHQ QUESTIONS 1-9: 0
SUM OF ALL RESPONSES TO PHQ QUESTIONS 1-9: 0
2. FEELING DOWN, DEPRESSED OR HOPELESS: 0
SUM OF ALL RESPONSES TO PHQ QUESTIONS 1-9: 0

## 2023-02-16 NOTE — PROGRESS NOTES
[address] [place] [address] DR. Cavazos [place] [address]  Dept: [phone]:  [facility] Randallia Salinas (:  1990) is a 29 y.o. male, here for evaluation of the following chief complaint(s):  Follow-up      ASSESSMENT/PLAN:  1. Chest pain, unspecified type  -     EKG 12 Lead - Clinic Performed; Future  -     XR CHEST STANDARD (2 VW); Future  2. Shortness of breath  -     CBC with Auto Differential; Future  -     Basic Metabolic Panel; Future  -     XR CHEST STANDARD (2 VW); Future  3. Other fatigue  -     CBC with Auto Differential; Future  -     Basic Metabolic Panel; Future  -     TSH with Reflex; Future  4. Epididymal cyst  -     US SCROTUM AND TESTICLES; Future  5. Testicular nodule  -     US SCROTUM AND TESTICLES; Future  6. Screening for lipid disorders  -     Lipid Panel; Future  7. Psoriasis    In office EKG interpretation- NSR within normal limits. Rate 88, , . No t-wave abnormality. No ST segment elevation or depression. Normal Chest XR. Will obtain labs to further evaluate symptoms. He was tasked with focusing on improving his diet and exercise to help promote weight loss. Keep an at home BP journal. Follow up if BP remain elevated above 140/80 at home. Smoking cessations discussed. Trial Prilosec OTC once a day as his symptoms may be related to GERD. Fasting lipid to screen for lipid disorders. US of scrotum/testicles to reevaluate bilateral epididymal head cysts seen on previous US. Plaque psoriasis stable with current regimen. Continue follow up with dermatology. Educated on signs of worsening symptoms and when prompt medical attention is needed and when he should seek further care. Return in 6 months (on 2023) for Follow up, As needed or if symptoms don't improve.     SUBJECTIVE/OBJECTIVE:  SIENA    Barrera Mcclure presents today to discuss a a few concerns that he and his wife are having regarding his overall health. First, he states that he has been having some chest pain, pressure, and tightness in the mid sternal region and in his back between his shoulder blades. This has been ongoing since Friday. The pain does occasionally radiate down his left arm. Around the same time he has noticed some SOB both with exertion and at rest. He is currently having some minor SOB sitting here now. His BP a few months ago was elevated. Today his BP on arrival was 156/88 and the recheck after allowing him to sit down for awhile was 126/84. He does not check his BP at home. He does not monitor his diet. He does not exercise. 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 He is able to control his anxiety for the most part on his own or by talking with friends/family. He does drink coffee fairly regularly but does not drink additionally caffeine. He does socially drink alcohol and when he drinks he does occasionally smoke cigarettes. He has not recently been sick. He denies fever, congestion, cough, wheezing, body aches, chills, headaches, vision changes, hearing loss, balance concerns, or weakness. He does not report any heartburn but he does occasionally take TUMS and pepto when he has in onset of chest pain and this tends to improve pain. His chest does not currently hurt. He does overall feel fatigued most days. This could be due to lack of consistent sleep schedule due to job. He has not had any recent lab work. Secondly, he does report history of bilateral epididymal head cysts noted on 3/25/2021 US of scrotum and testicles. This was obtained due to noticed testicle lump on right side. Since then Ernesto Escobar has noticed the right nodule seems to have increased. He denies any pain, discomfort, sexual dysfunction, burning with urination or penile discharge. Additionally, Ernesto Escobar was referred to dermatology for severe plaque psoriasis. He was started on a topical PDE-4 inhibitor - Zoryve (roflumilast) once daily cream. His psoriasis has much improved since starting. He does have some dry areas to his flexor elbows as well as a red/dry plaque area to his upper hand but for the most part his rash has improved. It does still itch but nearly as bad as prior. Past Medical History:   Diagnosis Date    H/O psoriasis         History reviewed. No pertinent surgical history. Social History     Socioeconomic History    Marital status:      Spouse name: Not on file    Number of children: Not on file    Years of education: Not on file    Highest education level: Not on file   Occupational History    Not on file   Tobacco Use    Smoking status: Some Days     Types: Cigarettes    Smokeless tobacco: Never   Substance and Sexual Activity    Alcohol use: Yes    Drug use: No    Sexual activity: Not on file   Other Topics Concern    Not on file   Social History Narrative    Not on file     Social Determinants of Health     Financial Resource Strain: Low Risk     Difficulty of Paying Living Expenses: Not hard at all   Food Insecurity: No Food Insecurity    Worried About [address] in the Last Year: Never true    [address] [employer] in the Last Year: Never true   Transportation Needs: No Transportation Needs    Lack of Transportation (Medical): No    Lack of Transportation (Non-Medical): No   Physical Activity: Not on file   Stress: Not on file   Social Connections: Not on file   Intimate Partner Violence: Not on file   Housing Stability: Unknown    Unable to Pay for Housing in the Last Year: Not on file    Number of Places Lived in the Last Year: Not on file    Unstable Housing in the Last Year: No        Family History   Problem Relation Age of Onset    Diabetes Father     High Blood Pressure Father         No Known Allergies     Review of Systems   Constitutional:  Positive for fatigue.  Negative for activity change, appetite change, chills, fever and unexpected weight change. HENT:  Negative for congestion, postnasal drip, rhinorrhea, sinus pressure and sore throat. Eyes:  Negative for photophobia, pain, redness, itching and visual disturbance. Respiratory:  Positive for chest tightness and shortness of breath. Negative for apnea, cough, choking, wheezing and stridor. Cardiovascular:  Positive for chest pain. Negative for palpitations and leg swelling. Gastrointestinal:  Positive for rectal pain. Negative for abdominal distention, abdominal pain, constipation, diarrhea, nausea and vomiting. Endocrine: Negative for cold intolerance, heat intolerance, polydipsia, polyphagia and polyuria. Genitourinary:  Negative for difficulty urinating, dysuria, frequency and urgency. Musculoskeletal:  Negative for back pain and myalgias. Skin:  Positive for rash. Neurological:  Negative for dizziness, weakness, light-headedness and headaches. Hematological:  Negative for adenopathy. Psychiatric/Behavioral:  Negative for behavioral problems, decreased concentration, self-injury, sleep disturbance and suicidal ideas. The patient is not nervous/anxious. Physical Exam  Vitals and nursing note reviewed. Constitutional:       General: He is not in acute distress. Appearance: Normal appearance. He is well-developed. He is obese. He is not diaphoretic. HENT:      Head: Normocephalic and atraumatic. Right Ear: Tympanic membrane, ear canal and external ear normal.      Left Ear: Tympanic membrane, ear canal and external ear normal.      Nose: Nose normal.   Eyes:      General: No scleral icterus. Right eye: No discharge. Left eye: No discharge. Conjunctiva/sclera: Conjunctivae normal.      Pupils: Pupils are equal, round, and reactive to light. Neck:      Thyroid: No thyromegaly. Cardiovascular:      Rate and Rhythm: Normal rate and regular rhythm. Pulses: Normal pulses. Heart sounds: Normal heart sounds.  No murmur heard. No friction rub. No gallop. Pulmonary:      Effort: Pulmonary effort is normal. No respiratory distress. Breath sounds: Normal breath sounds. No wheezing or rales. Chest:      Chest wall: No tenderness. Abdominal:      Palpations: Abdomen is soft. Tenderness: There is no abdominal tenderness. There is no right CVA tenderness or left CVA tenderness. Hernia: No hernia is present. Musculoskeletal:         General: Normal range of motion. Right shoulder: No swelling, tenderness or bony tenderness. Left shoulder: No swelling, tenderness or bony tenderness. Cervical back: Normal range of motion and neck supple. No deformity, spasms, tenderness or bony tenderness. Thoracic back: No spasms, tenderness or bony tenderness. Lumbar back: No swelling, deformity, spasms, tenderness or bony tenderness. Right lower leg: No edema. Left lower leg: No edema. Lymphadenopathy:      Cervical: No cervical adenopathy. Skin:     General: Skin is warm and dry. Coloration: Skin is not pale. Findings: Rash present. Comments: Various patches of thick, erythremic, silvery-white scales spread throughout the body. Lesions noted to bilateral elbows, torso, back, bilateral knees, right dorsal hand, and bilateral lower legs. Rash improved since last visit. Neurological:      General: No focal deficit present. Mental Status: He is alert and oriented to person, place, and time. Motor: No weakness or abnormal muscle tone. Coordination: Coordination normal.   Psychiatric:         Mood and Affect: Mood normal.         Speech: Speech normal.         Behavior: Behavior normal.         Thought Content:  Thought content normal.         Judgment: Judgment normal.       Vitals:    02/16/23 1000 02/16/23 1050   BP: (!) 156/88 126/84   Site: Right Upper Arm    Position: Sitting    Cuff Size: Large Adult    Pulse: 74 82   Resp: 20    SpO2: 99%    Weight: 279 lb (126.6 kg)    Height: 5' 6\" (1.676 m)       BP Readings from Last 3 Encounters:   02/16/23 126/84   12/15/22 (!) 142/88   03/06/20 122/84       On this date 2/16/2023 I have spent 29 minutes reviewing previous notes, test results and face to face with the patient discussing the diagnosis and importance of compliance with the treatment plan as well as documenting on the day of the visit.      An electronic signature was used to authenticate this note.    Ritesh Leal, LORAINE - CNP

## 2023-02-17 ENCOUNTER — HOSPITAL ENCOUNTER (OUTPATIENT)
Dept: ULTRASOUND IMAGING | Age: 33
Discharge: HOME | End: 2023-02-17
Payer: COMMERCIAL

## 2023-02-17 DIAGNOSIS — N50.3 EPIDIDYMAL CYST: ICD-10-CM

## 2023-02-17 DIAGNOSIS — N50.89 TESTICULAR NODULE: ICD-10-CM

## 2023-02-17 PROCEDURE — 76870 US EXAM SCROTUM: CPT

## 2023-03-22 ENCOUNTER — TELEPHONE (OUTPATIENT)
Dept: FAMILY MEDICINE CLINIC | Age: 33
End: 2023-03-22

## 2023-03-22 DIAGNOSIS — E66.01 CLASS 3 SEVERE OBESITY WITHOUT SERIOUS COMORBIDITY WITH BODY MASS INDEX (BMI) OF 45.0 TO 49.9 IN ADULT, UNSPECIFIED OBESITY TYPE (HCC): Primary | ICD-10-CM

## 2023-03-22 NOTE — TELEPHONE ENCOUNTER
Pt called stating he would like to start taking Semaglutide. His wife takes it and he wants to try it. He is requesting a phone call from Victor [facility].

## 2023-03-22 NOTE — TELEPHONE ENCOUNTER
Called and discussed weight management with patient. Patient BMI form last visit on 2/16 was 45.03  Since then he has tried to improve diet by monitoring carbs and eating less fats.     -Discussed semaglutide prescription. Patient would like to try medication to assist with weight loss. He states he can get this prescription through outside provider. Did have recent lab work which was normal. I told him normal dosages starting out, how medication works, how to administer and possible side effects. Follow up in 3 months if he does start medication. He is to continue to focus on improving diet but also increase aerobic exercise to 30 minutes each day for at least 5 days a weeks.

## 2023-07-13 ENCOUNTER — HOSPITAL ENCOUNTER (OUTPATIENT)
Age: 33
Discharge: HOME | End: 2023-07-13

## 2023-07-13 PROCEDURE — 86480 TB TEST CELL IMMUN MEASURE: CPT

## 2023-07-16 LAB
GAMMA INTERFERON BACKGROUND BLD IA-ACNC: 0.02 IU/ML
M TB IFN-G BLD-IMP: NEGATIVE
M TB IFN-G CD4+ BCKGRND COR BLD-ACNC: 0 IU/ML (ref 0–0.34)
M TB IFN-G CD4+CD8+ BCKGRND COR BLD-ACNC: 0 IU/ML (ref 0–0.34)
MITOGEN IGNF BCKGRD COR BLD-ACNC: > 10 IU/ML

## 2024-05-21 ENCOUNTER — OFFICE VISIT (OUTPATIENT)
Dept: FAMILY MEDICINE CLINIC | Age: 34
End: 2024-05-21
Payer: COMMERCIAL

## 2024-05-21 VITALS
WEIGHT: 274.5 LBS | BODY MASS INDEX: 44.11 KG/M2 | DIASTOLIC BLOOD PRESSURE: 88 MMHG | HEART RATE: 74 BPM | TEMPERATURE: 97.5 F | OXYGEN SATURATION: 97 % | HEIGHT: 66 IN | SYSTOLIC BLOOD PRESSURE: 138 MMHG | RESPIRATION RATE: 18 BRPM

## 2024-05-21 DIAGNOSIS — R53.83 OTHER FATIGUE: Primary | ICD-10-CM

## 2024-05-21 DIAGNOSIS — Z13.29 SCREENING FOR THYROID DISORDER: ICD-10-CM

## 2024-05-21 DIAGNOSIS — E66.01 CLASS 3 SEVERE OBESITY WITHOUT SERIOUS COMORBIDITY WITH BODY MASS INDEX (BMI) OF 45.0 TO 49.9 IN ADULT, UNSPECIFIED OBESITY TYPE (HCC): ICD-10-CM

## 2024-05-21 DIAGNOSIS — Z13.220 ENCOUNTER FOR SCREENING FOR LIPID DISORDER: ICD-10-CM

## 2024-05-21 DIAGNOSIS — I10 PRIMARY HYPERTENSION: ICD-10-CM

## 2024-05-21 LAB
ANION GAP SERPL CALC-SCNC: 10 MEQ/L (ref 8–16)
BASOPHILS ABSOLUTE: 0 THOU/MM3 (ref 0–0.1)
BASOPHILS NFR BLD AUTO: 0.4 %
BUN SERPL-MCNC: 12 MG/DL (ref 7–22)
CALCIUM SERPL-MCNC: 10.1 MG/DL (ref 8.5–10.5)
CHLORIDE SERPL-SCNC: 103 MEQ/L (ref 98–111)
CHOLEST SERPL-MCNC: 147 MG/DL (ref 100–199)
CO2 SERPL-SCNC: 27 MEQ/L (ref 23–33)
CREAT SERPL-MCNC: 0.8 MG/DL (ref 0.4–1.2)
DEPRECATED RDW RBC AUTO: 38.7 FL (ref 35–45)
EOSINOPHIL NFR BLD AUTO: 1.3 %
EOSINOPHILS ABSOLUTE: 0.1 THOU/MM3 (ref 0–0.4)
ERYTHROCYTE [DISTWIDTH] IN BLOOD BY AUTOMATED COUNT: 13.2 % (ref 11.5–14.5)
GFR SERPL CREATININE-BSD FRML MDRD: > 90 ML/MIN/1.73M2
GLUCOSE SERPL-MCNC: 79 MG/DL (ref 70–108)
HCT VFR BLD AUTO: 54.5 % (ref 42–52)
HDLC SERPL-MCNC: 34 MG/DL
HGB BLD-MCNC: 18 GM/DL (ref 14–18)
IMM GRANULOCYTES # BLD AUTO: 0.02 THOU/MM3 (ref 0–0.07)
IMM GRANULOCYTES NFR BLD AUTO: 0.2 %
LDLC SERPL CALC-MCNC: 91 MG/DL
LYMPHOCYTES ABSOLUTE: 3.6 THOU/MM3 (ref 1–4.8)
LYMPHOCYTES NFR BLD AUTO: 32.2 %
MCH RBC QN AUTO: 27.9 PG (ref 26–33)
MCHC RBC AUTO-ENTMCNC: 33 GM/DL (ref 32.2–35.5)
MCV RBC AUTO: 84.4 FL (ref 80–94)
MONOCYTES ABSOLUTE: 1 THOU/MM3 (ref 0.4–1.3)
MONOCYTES NFR BLD AUTO: 9.3 %
NEUTROPHILS ABSOLUTE: 6.3 THOU/MM3 (ref 1.8–7.7)
NEUTROPHILS NFR BLD AUTO: 56.6 %
NRBC BLD AUTO-RTO: 0 /100 WBC
PLATELET # BLD AUTO: 209 THOU/MM3 (ref 130–400)
PMV BLD AUTO: 10.8 FL (ref 9.4–12.4)
POTASSIUM SERPL-SCNC: 4 MEQ/L (ref 3.5–5.2)
RBC # BLD AUTO: 6.46 MILL/MM3 (ref 4.7–6.1)
SODIUM SERPL-SCNC: 140 MEQ/L (ref 135–145)
T4 FREE SERPL-MCNC: 1.26 NG/DL (ref 0.93–1.68)
TRIGL SERPL-MCNC: 110 MG/DL (ref 0–199)
TSH SERPL DL<=0.005 MIU/L-ACNC: 4.37 UIU/ML (ref 0.4–4.2)
WBC # BLD AUTO: 11.1 THOU/MM3 (ref 4.8–10.8)

## 2024-05-21 PROCEDURE — 99213 OFFICE O/P EST LOW 20 MIN: CPT

## 2024-05-21 PROCEDURE — 3075F SYST BP GE 130 - 139MM HG: CPT

## 2024-05-21 PROCEDURE — 3079F DIAST BP 80-89 MM HG: CPT

## 2024-05-21 SDOH — ECONOMIC STABILITY: INCOME INSECURITY: HOW HARD IS IT FOR YOU TO PAY FOR THE VERY BASICS LIKE FOOD, HOUSING, MEDICAL CARE, AND HEATING?: NOT HARD AT ALL

## 2024-05-21 SDOH — ECONOMIC STABILITY: FOOD INSECURITY: WITHIN THE PAST 12 MONTHS, YOU WORRIED THAT YOUR FOOD WOULD RUN OUT BEFORE YOU GOT MONEY TO BUY MORE.: NEVER TRUE

## 2024-05-21 SDOH — ECONOMIC STABILITY: FOOD INSECURITY: WITHIN THE PAST 12 MONTHS, THE FOOD YOU BOUGHT JUST DIDN'T LAST AND YOU DIDN'T HAVE MONEY TO GET MORE.: NEVER TRUE

## 2024-05-21 ASSESSMENT — PATIENT HEALTH QUESTIONNAIRE - PHQ9
SUM OF ALL RESPONSES TO PHQ9 QUESTIONS 1 & 2: 0
SUM OF ALL RESPONSES TO PHQ QUESTIONS 1-9: 0
1. LITTLE INTEREST OR PLEASURE IN DOING THINGS: NOT AT ALL
2. FEELING DOWN, DEPRESSED OR HOPELESS: NOT AT ALL

## 2024-05-21 ASSESSMENT — ENCOUNTER SYMPTOMS
COUGH: 0
ABDOMINAL DISTENTION: 0
SORE THROAT: 0
NAUSEA: 0
SHORTNESS OF BREATH: 0
VOMITING: 0
PHOTOPHOBIA: 0
RHINORRHEA: 0
CHEST TIGHTNESS: 0
WHEEZING: 0
ABDOMINAL PAIN: 0
CONSTIPATION: 0
DIARRHEA: 0
EYE ITCHING: 0
RECTAL PAIN: 0
CHOKING: 0
EYE PAIN: 0
SINUS PRESSURE: 0
STRIDOR: 0
APNEA: 0
BACK PAIN: 0
EYE REDNESS: 0

## 2024-05-21 NOTE — PROGRESS NOTES
[facility]  [address]   [facility] [address]  Dept: [phone]  Loc: [phone]     Pete Jean (:  1990) is a 33 y.o. male, here for evaluation of the following chief complaint(s):  Wants to get blood work ordered and Hypertension      ASSESSMENT/PLAN:  1. Other fatigue  -     Basic Metabolic Panel; Future  -     CBC with Auto Differential; Future  -     TSH with Reflex; Future  2. Encounter for screening for lipid disorder  -     Lipid Panel; Future  3. Screening for thyroid disorder  -     TSH with Reflex; Future  4. Class 3 severe obesity without serious comorbidity with body mass index (BMI) of 45.0 to 49.9 in adult, unspecified obesity type (HCC)  -     Semaglutide-Weight Management (WEGOVY) 1.7 MG/0.75ML SOAJ SC injection; Inject 1.7 mg into the skin every 7 days, Disp-3 mL, R-0Normal  5. Primary hypertension  -     Basic Metabolic Panel; Future  -     CBC with Auto Differential; Future  Recheck fasting labs.    Blood pressure stable today.    Weight management discussed.  Recommend improving diet and limiting carb intake.  May try starting carb counting.  He should start to increase his exercise.  I did recommend 150 minutes/week of moderate activity.  Will send in Wegovy to help further assist his weight management.  He is currently on the dose of 1.7 mg.      Return in about 1 year (around 2025) for Follow up.    SUBJECTIVE/OBJECTIVE:  SIENA Freeman is here today for follow-up.  He states that overall he has been doing well.  He is following with dermatology for his history of psoriasis.  He is on Skyrizi which has greatly helped.  He has no current rash.    He has history of intermittent high blood pressure.  He is not on any current medications.  He states he has been checking his blood pressures for the most part has been in the 120s systolically.  He has had a few occurrences where they have been 140

## 2024-05-22 ENCOUNTER — TELEPHONE (OUTPATIENT)
Dept: FAMILY MEDICINE CLINIC | Age: 34
End: 2024-05-22

## 2024-05-22 NOTE — TELEPHONE ENCOUNTER
Last appointment this department: 5/21/2024  Next appointment this department: Visit date not found      Prior Auth submitted on Cover My Med for Wegovy    Key: [id number]

## 2024-05-28 NOTE — TELEPHONE ENCOUNTER
Your prior authorization request has been denied.    Your request for prior authorization was denied, but an Electronic Appeal is available for your patient. Complete the questions in the Appeal section at the bottom of this page to pursue the appeal. For assistance, contact our support team at [phone].  Message from plan: This request has not been approved. Your request was reviewed by the health plan and based on the information submitted was not approved. Medications for weight loss are an EXCLUDED BENEFIT by the health plan and are not covered for any reason. This Excluded Benefit is not eligible for appeal. Appeal requests for this excluded benefit will not be reviewed by the health plan. This is why your request is denied.  assistance may be available. Please visit  website for more information. A written notification letter will follow with additional details.

## 2024-12-04 ENCOUNTER — TELEPHONE (OUTPATIENT)
Dept: INTERNAL MEDICINE | Age: 34
End: 2024-12-04

## 2024-12-04 ENCOUNTER — ENROLLMENT (OUTPATIENT)
Dept: INTERNAL MEDICINE | Age: 34
End: 2024-12-04

## 2024-12-04 NOTE — TELEPHONE ENCOUNTER
Specialty Medication Service    Date: 12/4/2024  Patient's Name: Pete Jean YOB: 1990            _____________________________________________________________________________________________    Pulled notes from Pa added them to        Dolores Mathew CPhT  Clinical    Specialty Medication Services  Phone: [phone] option #4  Fax: [phone]    For Pharmacy Admin Tracking Only    Program: [facility]  CPA in place:  No  Recommendation Provided To: Provider: 1 via Note to Provider  Intervention Detail:   Intervention Accepted By: Provider: 1  Gap Closed?:    Time Spent (min): 5

## 2024-12-05 ENCOUNTER — HOSPITAL ENCOUNTER (OUTPATIENT)
Age: 34
Discharge: HOME | End: 2024-12-05
Payer: COMMERCIAL

## 2024-12-05 PROCEDURE — 86480 TB TEST CELL IMMUN MEASURE: CPT

## 2024-12-06 ENCOUNTER — TELEPHONE (OUTPATIENT)
Dept: INTERNAL MEDICINE | Age: 34
End: 2024-12-06

## 2024-12-06 NOTE — TELEPHONE ENCOUNTER
Specialty Medication Service    Date: 12/6/2024  Patient's Name: Pete Jean YOB: 1990            _____________________________________________________________________________________________    Spoke with patient to schedule PharmD initial appointment for Specialty Medication Services. Patient scheduled 12/09/24 @9a  Most recent office notes from Prior auth in patient chart.    Added them to      Dolores Mathew CPhT  Clinical   Specialty Medication Services  Phone: [phone] option #4  Fax: [phone]    For Pharmacy Admin Tracking Only    Program: [facility]  CPA in place:  No  Recommendation Provided To: Patient/Caregiver: 1 via Telephone  Intervention Detail: Scheduled Appointment  Intervention Accepted By: Patient/Caregiver: 1  Gap Closed?:    Time Spent (min): 10

## 2024-12-06 NOTE — PROGRESS NOTES
above. If you are not or unsure, please re-schedule the visit: Yes, I confirm.     Note: not billable if this call serves to triage the patient into an appointment for the relevant concern    Pete MAI Jean is a 34 y.o. male evaluated via telephone on 12/9/2024 for Medication Management ([facility] PharmD initial review)    For Pharmacy Admin Tracking Only    Program: [facility]  CPA in place:  No  Recommendation Provided To: Patient/Caregiver: 3 via Virtual Visit and Other: 1  Intervention Detail: Benefit Assistance, Scheduled Appointment, and Vaccine Recommended/Administered  Intervention Accepted By: Patient/Caregiver: 3 and Other: 1  Time Spent (min): 60

## 2024-12-08 LAB
GAMMA INTERFERON BACKGROUND BLD IA-ACNC: 0.05 IU/ML
M TB IFN-G BLD-IMP: NEGATIVE
M TB IFN-G CD4+ BCKGRND COR BLD-ACNC: 0 IU/ML
M TB IFN-G CD4+CD8+ BCKGRND COR BLD-ACNC: 0.01 IU/ML
MITOGEN IGNF BCKGRD COR BLD-ACNC: 9.95 IU/ML

## 2024-12-09 ENCOUNTER — PHARMACY VISIT (OUTPATIENT)
Dept: INTERNAL MEDICINE | Age: 34
End: 2024-12-09

## 2024-12-09 ENCOUNTER — TELEPHONE (OUTPATIENT)
Dept: INTERNAL MEDICINE | Age: 34
End: 2024-12-09

## 2024-12-09 DIAGNOSIS — L40.9 PSORIASIS: Primary | ICD-10-CM

## 2024-12-09 ASSESSMENT — PROMIS GLOBAL HEALTH SCALE
IN THE PAST 7 DAYS, HOW OFTEN HAVE YOU BEEN BOTHERED BY EMOTIONAL PROBLEMS, SUCH AS FEELING ANXIOUS, DEPRESSED, OR IRRITABLE [ON A SCALE FROM 1 (NEVER) TO 5 (ALWAYS)]?: RARELY
IN GENERAL, HOW WOULD YOU RATE YOUR MENTAL HEALTH, INCLUDING YOUR MOOD AND YOUR ABILITY TO THINK [ON A SCALE OF 1 (POOR) TO 5 (EXCELLENT)]?: VERY GOOD
SUM OF RESPONSES TO QUESTIONS 2, 4, 5, & 10: 16
IN GENERAL, PLEASE RATE HOW WELL YOU CARRY OUT YOUR USUAL SOCIAL ACTIVITIES (INCLUDES ACTIVITIES AT HOME, AT WORK, AND IN YOUR COMMUNITY, AND RESPONSIBILITIES AS A PARENT, CHILD, SPOUSE, EMPLOYEE, FRIEND, ETC) [ON A SCALE OF 1 (POOR) TO 5 (EXCELLENT)]?: VERY GOOD
IN GENERAL, WOULD YOU SAY YOUR QUALITY OF LIFE IS...[ON A SCALE OF 1 (POOR) TO 5 (EXCELLENT)]: VERY GOOD
SUM OF RESPONSES TO QUESTIONS 3, 6, 7, & 8: 14
IN THE PAST 7 DAYS, HOW WOULD YOU RATE YOUR PAIN ON AVERAGE [ON A SCALE FROM 0 (NO PAIN) TO 10 (WORST IMAGINABLE PAIN)]?: 2
IN GENERAL, HOW WOULD YOU RATE YOUR SATISFACTION WITH YOUR SOCIAL ACTIVITIES AND RELATIONSHIPS [ON A SCALE OF 1 (POOR) TO 5 (EXCELLENT)]?: VERY GOOD
IN GENERAL, WOULD YOU SAY YOUR HEALTH IS...[ON A SCALE OF 1 (POOR) TO 5 (EXCELLENT)]: VERY GOOD
IN THE PAST 7 DAYS, HOW WOULD YOU RATE YOUR FATIGUE ON AVERAGE [ON A SCALE FROM 1 (NONE) TO 5 (VERY SEVERE)]?: MILD
TO WHAT EXTENT ARE YOU ABLE TO CARRY OUT YOUR EVERYDAY PHYSICAL ACTIVITIES SUCH AS WALKING, CLIMBING STAIRS, CARRYING GROCERIES, OR MOVING A CHAIR [ON A SCALE OF 1 (NOT AT ALL) TO 5 (COMPLETELY)]?: COMPLETELY
IN GENERAL, HOW WOULD YOU RATE YOUR PHYSICAL HEALTH [ON A SCALE OF 1 (POOR) TO 5 (EXCELLENT)]?: GOOD

## 2024-12-09 NOTE — TELEPHONE ENCOUNTER
Specialty Medication Service    Date: 12/9/2024  Patient's Name: Pete Jean YOB: 1990            _____________________________________________________________________________________________    Faxed patient's specialist SMS referral form to complete for compliance purposes.      Sandy Ragland [facility]  Pharmacy   Specialty Medication Services   Phone: [phone] option 4    For Pharmacy Admin Tracking Only    Program: [employer]  CPA in place:  Yes  Recommendation Provided To: Provider: 1 via Fax sent to office  Intervention Detail: Referral to Other Provider  Intervention Accepted By: Provider: 0  Gap Closed?:    Time Spent (min): 15

## 2025-02-06 ENCOUNTER — PHARMACY VISIT (OUTPATIENT)
Dept: INTERNAL MEDICINE | Age: 35
End: 2025-02-06

## 2025-02-06 DIAGNOSIS — L40.9 PSORIASIS: Primary | ICD-10-CM

## 2025-02-07 NOTE — PROGRESS NOTES
Initial Specialty Medication Virtual Visit  [facility]  [facility]  [address] [place] [address]  Dept: [phone]  Dept Fax: [phone]  Date of patient's visit: 2/7/2025  Patient's Name:  Pete Jean YOB: 1990            Patient Care Team:  Ritesh Leal APRN - CNP as PCP - General (Certified Nurse Practitioner)  Ritesh Leal APRN - CNP as PCP - Empaneled Provider  ================================================================    REASON FOR VISIT/CHIEF COMPLAINT:  Medication Check (Skyrizi )      HISTORY OF PRESENTING ILLNESS:  Pete Jean is 34 y.o. is here for initial virtual visit for specialty medication.        Specialty Medication: Skyrizi 150mg/mL SOAJ   Frequency: Every 12 weeks  Indication: Psoriasis  Initially Diagnosed: ~2020  Additional Therapy:   None  Previous Therapy:   Triamcinolone  Betamethasone  Ketoconazole  Cetaphil  Zoryve     Specialist:   Jenniffer Muñiz  [address] [place] [address]  [phone]    Pete Jean has no new complain today.       DIAGNOSTIC FINDINGS:  CBC:  Lab Results   Component Value Date/Time    WBC 11.1 05/21/2024 09:32 AM    HGB 18.0 05/21/2024 09:32 AM     05/21/2024 09:32 AM       BMP:    Lab Results   Component Value Date/Time     05/21/2024 09:32 AM    K 4.0 05/21/2024 09:32 AM     05/21/2024 09:32 AM    CO2 27 05/21/2024 09:32 AM    BUN 12 05/21/2024 09:32 AM    CREATININE 0.8 05/21/2024 09:32 AM    GLUCOSE 79 05/21/2024 09:32 AM       HEMOGLOBIN A1C: No results found for: \"LABA1C\"    FASTING LIPID PANEL:  Lab Results   Component Value Date    CHOL 147 05/21/2024    HDL 34 05/21/2024    TRIG 110 05/21/2024       No results found for: \"VENESSA\"    Lab Results   Component Value Date    HEPAIGM Negative 03/10/2020    HEPBIGM Negative 03/10/2020    HEPCAB Negative 03/10/2020           There is no problem list on file for this patient.      Health Maintenance Due   Topic

## 2025-06-04 ENCOUNTER — TELEPHONE (OUTPATIENT)
Age: 35
End: 2025-06-04

## 2025-06-04 NOTE — PROGRESS NOTES
(D4W7-51) Vaccine PF IM 12/04/2009    Influenza Trivalent 09/11/2024    Influenza, FLUARIX, FLULAVAL, FLUZONE (age 6 mo+) and AFLURIA, (age 3 y+), Quadv PF, 0.5mL 10/29/2020    Influenza, FLUCELVAX, (age 6 mo+), MDCK, Quadv PF, 0.5mL 08/31/2022    MMR, PRIORIX, M-M-R II, (age 12m+), SC, 0.5mL 02/10/1994, 05/04/2004    Polio OPV 01/21/1993, 04/22/1993, 12/08/1994, 08/14/1997    TDaP, ADACEL (age 10y-64y), BOOSTRIX (age 10y+), IM, 0.5mL 08/16/2012      Immunization status: missing doses of PCV20/21, Shingrix, TDaP.    ASSESSMENTS      6/9/2025     8:59 AM 12/9/2024     9:22 AM   PROMIS Questions   In general, would you say your health is: 5 4   In general, would you say your quality of life is: 5 4   In general, how would you rate your physical health? 5 3   In general, how would you rate your mental health, including your mood and your ability to think? 5 4   In general, how would you rate your satisfaction with your social activities and relationships? 5 4   In general, please rate how well you carry out your usual social activities and roles. (This includes activities at home, at work and in your community, and responsibilities as a parent, child, spouse, employee, friend, etc.) 5 4   To what extent are you able to carry out your everyday physical activities such as walking, climbing stairs, carrying groceries, or moving a chair? 5 5   In the past 7 days how often have you been bothered by emotional problems such as feeling anxious, depressed or irritable? 5 4   In the past 7 days how would you rate your fatigue on average? 5 4   In the past 7 days how would you rate your pain on average? 0 2   How comfortable are you filling out medical forms by yourself? 4    What amount of pain have you experienced in the last week in your other knee/hip? 0    My BACK PAIN at the moment is 0    Mode of Collection 2    Person Completing Survey 0    PROMIS Physical Score 15  14   PROMIS Mental Score 20  16       Patient-reported

## 2025-06-04 NOTE — TELEPHONE ENCOUNTER
Specialty Medication Service    Date: 6/4/2025  Patient's Name: Pete Jean YOB: 1990            _____________________________________________________________________________________________    Called patient's specialist office to request most recent office visit summary and relevant labs for Specialty Medication Service formulary medication.  Faxed  to have faxed to [phone].     Notes received and added to media.    Sandy Ragland CPhT  Pharmacy   Specialty Medication Services   Phone: [phone] option 4      For Pharmacy Admin Tracking Only    Program: [facility]  CPA in place:  Yes  Recommendation Provided To: Provider: 1 via Fax sent to office  Intervention Detail: Scheduled Appointment  Intervention Accepted By: Provider: 1  Gap Closed?:    Time Spent (min): 15

## 2025-06-09 ENCOUNTER — PHARMACY VISIT (OUTPATIENT)
Age: 35
End: 2025-06-09

## 2025-06-09 DIAGNOSIS — L40.9 PSORIASIS: ICD-10-CM

## 2025-06-09 ASSESSMENT — PROMIS GLOBAL HEALTH SCALE
SUM OF RESPONSES TO QUESTIONS 2, 4, 5, & 10: 20
IN GENERAL, WOULD YOU SAY YOUR HEALTH IS...[ON A SCALE OF 1 (POOR) TO 5 (EXCELLENT)]: EXCELLENT
IN GENERAL, PLEASE RATE HOW WELL YOU CARRY OUT YOUR USUAL SOCIAL ACTIVITIES (INCLUDES ACTIVITIES AT HOME, AT WORK, AND IN YOUR COMMUNITY, AND RESPONSIBILITIES AS A PARENT, CHILD, SPOUSE, EMPLOYEE, FRIEND, ETC) [ON A SCALE OF 1 (POOR) TO 5 (EXCELLENT)]?: EXCELLENT
IN THE PAST 7 DAYS, HOW OFTEN HAVE YOU BEEN BOTHERED BY EMOTIONAL PROBLEMS, SUCH AS FEELING ANXIOUS, DEPRESSED, OR IRRITABLE [ON A SCALE FROM 1 (NEVER) TO 5 (ALWAYS)]?: NEVER
WHO IS THE PERSON COMPLETING THE PROMIS V1.1 SURVEY?: SELF
SUM OF RESPONSES TO QUESTIONS 3, 6, 7, & 8: 15
IN GENERAL, HOW WOULD YOU RATE YOUR MENTAL HEALTH, INCLUDING YOUR MOOD AND YOUR ABILITY TO THINK [ON A SCALE OF 1 (POOR) TO 5 (EXCELLENT)]?: EXCELLENT
IN GENERAL, HOW WOULD YOU RATE YOUR MENTAL HEALTH, INCLUDING YOUR MOOD AND YOUR ABILITY TO THINK [ON A SCALE OF 1 (POOR) TO 5 (EXCELLENT)]?: EXCELLENT
IN GENERAL, WOULD YOU SAY YOUR QUALITY OF LIFE IS...[ON A SCALE OF 1 (POOR) TO 5 (EXCELLENT)]: EXCELLENT
IN THE PAST 7 DAYS, HOW WOULD YOU RATE YOUR PAIN ON AVERAGE [ON A SCALE FROM 0 (NO PAIN) TO 10 (WORST IMAGINABLE PAIN)]?: 0 NO PAIN
IN GENERAL, PLEASE RATE HOW WELL YOU CARRY OUT YOUR USUAL SOCIAL ACTIVITIES (INCLUDES ACTIVITIES AT HOME, AT WORK, AND IN YOUR COMMUNITY, AND RESPONSIBILITIES AS A PARENT, CHILD, SPOUSE, EMPLOYEE, FRIEND, ETC) [ON A SCALE OF 1 (POOR) TO 5 (EXCELLENT)]?: EXCELLENT
IN GENERAL, HOW WOULD YOU RATE YOUR PHYSICAL HEALTH [ON A SCALE OF 1 (POOR) TO 5 (EXCELLENT)]?: EXCELLENT
IN GENERAL, WOULD YOU SAY YOUR HEALTH IS...[ON A SCALE OF 1 (POOR) TO 5 (EXCELLENT)]: EXCELLENT
IN THE PAST 7 DAYS, HOW WOULD YOU RATE YOUR PAIN ON AVERAGE [ON A SCALE FROM 0 (NO PAIN) TO 10 (WORST IMAGINABLE PAIN)]?: 0 NO PAIN
IN THE PAST 7 DAYS, HOW OFTEN HAVE YOU BEEN BOTHERED BY EMOTIONAL PROBLEMS, SUCH AS FEELING ANXIOUS, DEPRESSED, OR IRRITABLE [ON A SCALE FROM 1 (NEVER) TO 5 (ALWAYS)]?: NEVER
IN GENERAL, WOULD YOU SAY YOUR QUALITY OF LIFE IS...[ON A SCALE OF 1 (POOR) TO 5 (EXCELLENT)]: EXCELLENT
IN GENERAL, HOW WOULD YOU RATE YOUR PHYSICAL HEALTH [ON A SCALE OF 1 (POOR) TO 5 (EXCELLENT)]?: EXCELLENT
TO WHAT EXTENT ARE YOU ABLE TO CARRY OUT YOUR EVERYDAY PHYSICAL ACTIVITIES SUCH AS WALKING, CLIMBING STAIRS, CARRYING GROCERIES, OR MOVING A CHAIR [ON A SCALE OF 1 (NOT AT ALL) TO 5 (COMPLETELY)]?: COMPLETELY
HOW IS THE PROMIS V1.1 BEING ADMINISTERED?: ELECTRONIC
IN GENERAL, HOW WOULD YOU RATE YOUR SATISFACTION WITH YOUR SOCIAL ACTIVITIES AND RELATIONSHIPS [ON A SCALE OF 1 (POOR) TO 5 (EXCELLENT)]?: EXCELLENT
IN GENERAL, HOW WOULD YOU RATE YOUR SATISFACTION WITH YOUR SOCIAL ACTIVITIES AND RELATIONSHIPS [ON A SCALE OF 1 (POOR) TO 5 (EXCELLENT)]?: EXCELLENT